# Patient Record
Sex: MALE | Race: BLACK OR AFRICAN AMERICAN | NOT HISPANIC OR LATINO | Employment: OTHER | URBAN - METROPOLITAN AREA
[De-identification: names, ages, dates, MRNs, and addresses within clinical notes are randomized per-mention and may not be internally consistent; named-entity substitution may affect disease eponyms.]

---

## 2017-03-28 ENCOUNTER — HOSPITAL ENCOUNTER (EMERGENCY)
Facility: HOSPITAL | Age: 22
Discharge: HOME | End: 2017-03-28
Attending: EMERGENCY MEDICINE

## 2017-03-28 VITALS
DIASTOLIC BLOOD PRESSURE: 66 MMHG | RESPIRATION RATE: 16 BRPM | TEMPERATURE: 99 F | WEIGHT: 145 LBS | OXYGEN SATURATION: 100 % | HEART RATE: 62 BPM | SYSTOLIC BLOOD PRESSURE: 132 MMHG

## 2017-03-28 DIAGNOSIS — B36.0 PITYRIASIS VERSICOLOR: Primary | ICD-10-CM

## 2017-03-28 PROCEDURE — 99283 EMERGENCY DEPT VISIT LOW MDM: CPT

## 2017-03-28 RX ORDER — HYDROXYZINE PAMOATE 25 MG/1
25 CAPSULE ORAL EVERY 6 HOURS PRN
Qty: 20 CAPSULE | Refills: 0 | Status: SHIPPED | OUTPATIENT
Start: 2017-03-28

## 2017-03-28 RX ORDER — KETOCONAZOLE 20 MG/ML
SHAMPOO, SUSPENSION TOPICAL DAILY
Qty: 120 ML | Refills: 0 | Status: SHIPPED | OUTPATIENT
Start: 2017-03-28

## 2017-03-28 NOTE — ED AVS SNAPSHOT
[facility] [address]  [place] [address]               Joon Callahan   3/28/2017  7:59 PM   ED    Description:  Male : 1995   Department:  [facility]           Your Care was Coordinated By:     Provider Role From To    Betsey Johnson MD Attending Provider 17 --    Clarence Bonilla NP Nurse Practitioner 17 --      Reason for Visit     Rash           Diagnoses this Visit        Comments    Pityriasis versicolor    -  Primary       ED Disposition     None           To Do List           Follow-up Information     Schedule an appointment as soon as possible for a visit with [facility].    Why:  within 1 week    Contact information:    [address] [place] [id number]  [phone]         These Medications        Disp Refills Start End    ketoconazole (NIZORAL) 2 % shampoo 120 mL 0 3/28/2017     Apply topically once daily. - Topical (Top)    hydrOXYzine pamoate (VISTARIL) 25 MG Cap 20 capsule 0 3/28/2017     Take 1 capsule (25 mg total) by mouth every 6 (six) hours as needed (itching). - Oral      [facility]     [facility] -  Assistance  Registered nurses in the [facility] provide clinical advisement, health education, appointment booking, and other advisory services.  Call for this free service at [phone].             Medications           Message regarding Medications     Verify the changes and/or additions to your medication regime listed below are the same as discussed with your clinician today.  If any of these changes or additions are incorrect, please notify your healthcare provider.        START taking these NEW medications        Refills    ketoconazole (NIZORAL) 2 % shampoo 0    Sig: Apply topically once daily.    Class: Print    Route: Topical (Top)    hydrOXYzine pamoate (VISTARIL) 25 MG Cap 0    Sig: Take 1 capsule (25 mg total) by mouth every 6  (six) hours as needed (itching).    Class: Print    Route: Oral           Verify that the below list of medications is an accurate representation of the medications you are currently taking.  If none reported, the list may be blank. If incorrect, please contact your healthcare provider. Carry this list with you in case of emergency.           Current Medications     hydrOXYzine pamoate (VISTARIL) 25 MG Cap Take 1 capsule (25 mg total) by mouth every 6 (six) hours as needed (itching).    ketoconazole (NIZORAL) 2 % shampoo Apply topically once daily.           Clinical Reference Information           Your Vitals Were     BP Pulse Temp Resp Weight SpO2    132/66 (BP Location: Left arm, Patient Position: Sitting) 62 99.4 °F (37.4 °C) (Oral) 16 65.8 kg (145 lb) 100%      Allergies as of 3/28/2017     No Known Allergies      Immunizations Administered on Date of Encounter - 3/28/2017     None      ED Micro, Lab, POCT     None      ED Imaging Orders     None      Discharge References/Attachments     TINEA VERSICOLOR (ENGLISH)    PITYRIASIS ROSEA (ENGLISH)      [facility] Sign-Up     Activating your [facility] account is as easy as 1-2-3!     1) Visit [email or web address], select Sign Up Now, enter this activation code and your date of birth, then select Next.  [email or web address]  Expires: 5/12/2017  8:45 PM      2) Create a username and password to use when you visit [facility] in the future and select a security question in case you lose your password and select Next.    3) Enter your e-mail address and click Sign Up!    Additional Information  If you have questions, please e-mail [email or web address] or call [phone] to talk to our [facility] staff. Remember, [facility] is NOT to be used for urgent needs. For medical emergencies, dial 911.          [facility] complies with applicable Federal civil rights laws and does not discriminate on the basis of race, color, national origin, age, disability, or sex.         Language Assistance Services     ATTENTION: Language assistance services are available, free of charge. Please call [phone].      ATENCIÓN: Si habla kevinañol, tiene a kenny disposición servicios gratuitos de asistencia lingüística. Llame al [phone].     CHÚ Ý: N?u b?n nói Ti?ng Vi?t, có các d?ch v? h? tr? ngôn ng? mi?n phí dành cho b?n. G?i s? [phone].

## 2017-03-29 NOTE — ED PROVIDER NOTES
Encounter Date: 3/28/2017       History     Chief Complaint   Patient presents with    Rash     itchy, raised rash x 2 months      Review of patient's allergies indicates:  No Known Allergies  HPI Comments: Well appearing, non toxic, afebrile  22 year old male here with c/o generalized rash x 1.5 months with itching. Unknown exposure. No reported fever, chills, chest pain, SOB, abdominal pain, N/V/D, or urinary symptoms. No treatments attempted PTA.    Patient is a 22 y.o. male presenting with the following complaint: rash. The history is provided by the patient and a relative. The history is limited by a language barrier. A  was used.   Rash    This is a new problem. Illness onset: 1.5 months ago. The problem has been waxing and waning. The problem is associated with an unknown factor. The rash is present on the torso, left arm, left upper leg, left lower leg, right arm, right buttock, left buttock, right upper leg and right lower leg. The pain is at a severity of 0/10. Associated symptoms include itching. Pertinent negatives include no blisters, no pain and no weeping. He has tried nothing for the symptoms.     History reviewed. No pertinent past medical history.  History reviewed. No pertinent surgical history.  History reviewed. No pertinent family history.  Social History   Substance Use Topics    Smoking status: Never Smoker    Smokeless tobacco: None    Alcohol use No     Review of Systems   Constitutional: Negative for chills and fever.   HENT: Negative for congestion, rhinorrhea, sore throat and trouble swallowing.    Respiratory: Negative for cough and shortness of breath.    Cardiovascular: Negative for chest pain.   Gastrointestinal: Negative for abdominal distention, abdominal pain, constipation, diarrhea, nausea and vomiting.   Genitourinary: Negative for difficulty urinating and dysuria.   Musculoskeletal: Negative for arthralgias, back pain, gait problem, joint swelling,  myalgias, neck pain and neck stiffness.   Skin: Positive for itching and rash. Negative for color change, pallor and wound.   Neurological: Negative for dizziness, weakness, light-headedness, numbness and headaches.       Physical Exam   Initial Vitals   BP Pulse Resp Temp SpO2   03/28/17 1852 03/28/17 1852 03/28/17 1852 03/28/17 1852 03/28/17 1852   132/66 62 16 99.4 °F (37.4 °C) 100 %     Physical Exam    Nursing note and vitals reviewed.  Constitutional: He appears well-developed and well-nourished. He is not diaphoretic. He is cooperative.  Non-toxic appearance. He does not have a sickly appearance. He does not appear ill. No distress.   HENT:   Head: Normocephalic and atraumatic.   Mouth/Throat: Oropharynx is clear and moist.   Eyes: Conjunctivae and EOM are normal. Pupils are equal, round, and reactive to light.   Neck: Normal range of motion. Neck supple.   Cardiovascular: Normal rate and regular rhythm.   Pulmonary/Chest: Effort normal and breath sounds normal. No accessory muscle usage. No apnea, no tachypnea and no bradypnea. No respiratory distress. He has no decreased breath sounds. He has no wheezes. He has no rhonchi. He has no rales. He exhibits no tenderness.   Abdominal: Soft. Bowel sounds are normal. He exhibits no distension. There is no tenderness. There is no rigidity, no rebound, no guarding and no CVA tenderness.   Musculoskeletal: Normal range of motion. He exhibits no edema or tenderness.   Neurological: He is alert and oriented to person, place, and time. He has normal strength. Coordination and gait normal. GCS eye subscore is 4. GCS verbal subscore is 5. GCS motor subscore is 6.   Skin: Skin is warm and dry. Rash noted. Rash is maculopapular. No erythema. No pallor.   Psychiatric: He has a normal mood and affect. His behavior is normal. Judgment and thought content normal.         ED Course   Procedures  Labs Reviewed - No data to display          Medical Decision Making:   History:   I  obtained history from: someone other than patient.  Initial Assessment:   Well appearing, non toxic, afebrile  22 year old male here with c/o generalized rash x 1.5 months with itching. Unknown exposure. No reported fever, chills, chest pain, SOB, abdominal pain, N/V/D, or urinary symptoms. No treatments attempted PTA. Pt is AAO x3, PERRLA, EOMs intact. Neck supple, non tender, full ROM, no adenopathy. Oropharynx moist and clear. Resp even and unlabored, breath sounds CTA, no tachypnea. Abdomen soft, non tender, non rigid, non distended, BS active. Generalized disseminated raised rough maculopapular rash, no drainage or weeping.  Differential Diagnosis:   Pityriasis, Rash  ED Management:  Pt likely with pityriasis versicolor. Will treat symptomatically with Ketoconazole and Vistaril. Air way patent, no respiratory distress, breath sounds CTA, no tachypnea. Discussed case with Dr. Johnson, in agreement with POC. Information provided for [facility] and instructed to call for FU appointment within 1 week. Discussed strict return precautions. Discussed POC with patient and relative, in agreement, verbalized understanding. Pt in stable condition upon discharge, VSS.              Attending Attestation:     Physician Attestation Statement for NP/PA:   I have conducted a face to face encounter with this patient in addition to the NP/PA, due to    Other NP/PA Attestation Additions:    History of Present Illness: Itchy rash x 2 months    Medical Decision Making: Appears to be pityriasis versicolor - treat with ketoconazole topically and hydroxyzine for itching.  Follow up with dermatology.                 ED Course     Clinical Impression:   The encounter diagnosis was Pityriasis versicolor.          Clarence Bonilla NP  03/28/17 2046       Betsey Johnson MD  04/02/17 1914